# Patient Record
Sex: MALE | Race: WHITE | URBAN - METROPOLITAN AREA
[De-identification: names, ages, dates, MRNs, and addresses within clinical notes are randomized per-mention and may not be internally consistent; named-entity substitution may affect disease eponyms.]

---

## 2020-12-04 ENCOUNTER — NEW PATIENT (OUTPATIENT)
Dept: URBAN - METROPOLITAN AREA CLINIC 85 | Facility: CLINIC | Age: 65
End: 2020-12-04
Payer: COMMERCIAL

## 2020-12-04 DIAGNOSIS — H25.13 AGE-RELATED NUCLEAR CATARACT, BILATERAL: Primary | ICD-10-CM

## 2020-12-04 DIAGNOSIS — H04.123 TEAR FILM INSUFFICIENCY OF BILATERAL LACRIMAL GLANDS: ICD-10-CM

## 2020-12-04 DIAGNOSIS — H25.043 POSTERIOR SUBCAPSULAR POLAR AGE-RELATED CATARACT, BILATERAL: ICD-10-CM

## 2020-12-04 PROCEDURE — 92004 COMPRE OPH EXAM NEW PT 1/>: CPT | Performed by: OPHTHALMOLOGY

## 2020-12-04 PROCEDURE — 92015 DETERMINE REFRACTIVE STATE: CPT | Performed by: OPHTHALMOLOGY

## 2020-12-04 RX ORDER — KETOROLAC TROMETHAMINE 5 MG/ML
0.5 % SOLUTION OPHTHALMIC
Qty: 5 | Refills: 1 | Status: ACTIVE
Start: 2020-12-04

## 2020-12-04 ASSESSMENT — KERATOMETRY
OS: 43.25
OD: 43.13

## 2020-12-04 ASSESSMENT — VISUAL ACUITY
OD: 20/30
OS: 20/60

## 2020-12-04 ASSESSMENT — INTRAOCULAR PRESSURE
OS: 14
OD: 14

## 2022-07-11 ENCOUNTER — OFFICE VISIT (OUTPATIENT)
Dept: URBAN - METROPOLITAN AREA CLINIC 85 | Facility: CLINIC | Age: 67
End: 2022-07-11
Payer: COMMERCIAL

## 2022-07-11 DIAGNOSIS — H25.043 POSTERIOR SUBCAPSULAR POLAR AGE-RELATED CATARACT, BILATERAL: ICD-10-CM

## 2022-07-11 DIAGNOSIS — H43.813 VITREOUS DEGENERATION, BILATERAL: ICD-10-CM

## 2022-07-11 DIAGNOSIS — H25.13 AGE-RELATED NUCLEAR CATARACT, BILATERAL: Primary | ICD-10-CM

## 2022-07-11 DIAGNOSIS — H40.023 OPEN ANGLE WITH BORDERLINE FINDINGS, HIGH RISK, BILATERAL: ICD-10-CM

## 2022-07-11 DIAGNOSIS — H04.123 DRY EYE SYNDROME OF BILATERAL LACRIMAL GLANDS: ICD-10-CM

## 2022-07-11 PROCEDURE — 92004 COMPRE OPH EXAM NEW PT 1/>: CPT | Performed by: OPTOMETRIST

## 2022-07-11 ASSESSMENT — VISUAL ACUITY
OS: CF 3FT
OD: 20/400

## 2022-07-11 ASSESSMENT — INTRAOCULAR PRESSURE
OS: 17
OD: 18

## 2022-07-11 NOTE — IMPRESSION/PLAN
Impression: Open angle with borderline findings, high risk, bilateral: H40.023. Plan: Discussed findings with patient and need for continued follow up here with periodic testing and asking family members in order to accurately assess risk. Recommend monitoring with glaucoma testing CVF, RNFL OCT, pach, etc. after CE/IOL OU, as with density of lenticular opacity, glaucoma testing will be affected.

## 2022-07-11 NOTE — IMPRESSION/PLAN
Impression: Age-related nuclear cataract, bilateral: H25.13. Plan: Discussed cataract findings. Discussed option of ce/iol OU along with questionable VA potential with macular findings and optic nerve cupping. Discussed risks, potential benefits, potential complications, and alternatives to surgery. Patient desires to have surgery. Recommend CE w/IOL consult with Dr. Adele Burk.

## 2022-12-01 ENCOUNTER — OFFICE VISIT (OUTPATIENT)
Dept: URBAN - METROPOLITAN AREA CLINIC 85 | Facility: CLINIC | Age: 67
End: 2022-12-01
Payer: COMMERCIAL

## 2022-12-01 DIAGNOSIS — H25.13 AGE-RELATED NUCLEAR CATARACT, BILATERAL: Primary | ICD-10-CM

## 2022-12-01 DIAGNOSIS — H25.043 POSTERIOR SUBCAPSULAR POLAR AGE-RELATED CATARACT, BILATERAL: ICD-10-CM

## 2022-12-01 PROCEDURE — 92014 COMPRE OPH EXAM EST PT 1/>: CPT | Performed by: OPHTHALMOLOGY

## 2022-12-01 PROCEDURE — 92136 OPHTHALMIC BIOMETRY: CPT | Performed by: OPHTHALMOLOGY

## 2022-12-01 ASSESSMENT — VISUAL ACUITY
OS: CF 1FT
OD: CF 5FT

## 2022-12-01 ASSESSMENT — INTRAOCULAR PRESSURE
OS: 18
OD: 18

## 2022-12-01 NOTE — IMPRESSION/PLAN
Impression: Age-related nuclear cataract, bilateral: H25.13. Plan: Cataracts account for the patient's complaints. Discussed all risks, benefits, procedures and recovery. Patient has quality of life issues. Patient understands changing glasses will not improve vision. Patient desires to have surgery, recommend phacoemulsification with intraocular lens. CE w/IOL _OS___. R/B/A discussed. RL2. Lens: _STD_   Aim: __plano_.   Asiya Salazar

## 2023-01-04 ENCOUNTER — SURGERY (OUTPATIENT)
Dept: URBAN - METROPOLITAN AREA SURGERY 55 | Facility: SURGERY | Age: 68
End: 2023-01-04
Payer: COMMERCIAL

## 2023-01-04 DIAGNOSIS — H25.13 AGE-RELATED NUCLEAR CATARACT, BILATERAL: Primary | ICD-10-CM

## 2023-01-04 PROCEDURE — 66984 XCAPSL CTRC RMVL W/O ECP: CPT | Performed by: OPHTHALMOLOGY

## 2023-01-05 ENCOUNTER — POST-OPERATIVE VISIT (OUTPATIENT)
Dept: URBAN - METROPOLITAN AREA CLINIC 85 | Facility: CLINIC | Age: 68
End: 2023-01-05
Payer: COMMERCIAL

## 2023-01-05 DIAGNOSIS — Z48.810 ENCOUNTER FOR SURGICAL AFTERCARE FOLLOWING SURGERY ON A SENSE ORGAN: Primary | ICD-10-CM

## 2023-01-05 PROCEDURE — 99024 POSTOP FOLLOW-UP VISIT: CPT | Performed by: OPHTHALMOLOGY

## 2023-01-05 ASSESSMENT — INTRAOCULAR PRESSURE: OS: 20

## 2023-01-05 NOTE — IMPRESSION/PLAN
Impression:  Encounter for surgical aftercare following surgery on a sense organ  Z48.810. Plan: Follow post op instructions and RTC as scheduled.

## 2023-01-11 ENCOUNTER — POST-OPERATIVE VISIT (OUTPATIENT)
Dept: URBAN - METROPOLITAN AREA CLINIC 85 | Facility: CLINIC | Age: 68
End: 2023-01-11
Payer: COMMERCIAL

## 2023-01-11 DIAGNOSIS — Z48.810 ENCOUNTER FOR SURGICAL AFTERCARE FOLLOWING SURGERY ON A SENSE ORGAN: Primary | ICD-10-CM

## 2023-01-11 PROCEDURE — 99024 POSTOP FOLLOW-UP VISIT: CPT | Performed by: OPTOMETRIST

## 2023-01-11 ASSESSMENT — VISUAL ACUITY
OS: 20/20
OD: CF 5FT

## 2023-01-11 ASSESSMENT — INTRAOCULAR PRESSURE
OD: 16
OS: 15

## 2023-01-11 NOTE — IMPRESSION/PLAN
Impression: S/P Cataract Extraction by phacoemulsification with IOL placement OS - 7 Days. Encounter for surgical aftercare following surgery on a sense organ  Z48.810. Plan: Follow post op instructions per surgeon and RTC as scheduled or ASAP if pain, decreased vision, or any worsening of condition.

## 2023-01-18 ENCOUNTER — SURGERY (OUTPATIENT)
Dept: URBAN - METROPOLITAN AREA SURGERY 55 | Facility: SURGERY | Age: 68
End: 2023-01-18
Payer: COMMERCIAL

## 2023-01-18 DIAGNOSIS — H25.11 AGE-RELATED NUCLEAR CATARACT, RIGHT EYE: Primary | ICD-10-CM

## 2023-01-18 PROCEDURE — 66984 XCAPSL CTRC RMVL W/O ECP: CPT | Performed by: OPHTHALMOLOGY

## 2023-01-19 ENCOUNTER — POST-OPERATIVE VISIT (OUTPATIENT)
Dept: URBAN - METROPOLITAN AREA CLINIC 85 | Facility: CLINIC | Age: 68
End: 2023-01-19
Payer: COMMERCIAL

## 2023-01-19 DIAGNOSIS — Z48.810 ENCOUNTER FOR SURGICAL AFTERCARE FOLLOWING SURGERY ON A SENSE ORGAN: Primary | ICD-10-CM

## 2023-01-19 PROCEDURE — 99024 POSTOP FOLLOW-UP VISIT: CPT | Performed by: OPHTHALMOLOGY

## 2023-01-19 RX ORDER — KETOROLAC TROMETHAMINE 5 MG/ML
0.5 % SOLUTION OPHTHALMIC
Qty: 5 | Refills: 1 | Status: ACTIVE
Start: 2023-01-19

## 2023-01-19 ASSESSMENT — INTRAOCULAR PRESSURE: OD: 18

## 2023-01-19 NOTE — IMPRESSION/PLAN
Impression: S/P Cataract Extraction by phacoemulsification with IOL placement OD - 1 Day. Encounter for surgical aftercare following surgery on a sense organ  Z48.810. OCT shows wet AMD OD. See retinal specialist on next PO visit. Plan: RTC as scheduled or   ASAP if there should be any decrease in vision, pain, or any worsening of condition. Follow post op instructions per surgeon and RTC as scheduled or ASAP if pain, decreased vision, or any worsening of condition.

## 2023-02-08 ENCOUNTER — OFFICE VISIT (OUTPATIENT)
Dept: URBAN - METROPOLITAN AREA CLINIC 85 | Facility: CLINIC | Age: 68
End: 2023-02-08
Payer: COMMERCIAL

## 2023-02-08 DIAGNOSIS — H35.3211 EXDTVE AGE-REL MCLR DEGN, RIGHT EYE, WITH ACTV CHRDL NEOVAS: Primary | ICD-10-CM

## 2023-02-08 DIAGNOSIS — Z96.1 PRESENCE OF INTRAOCULAR LENS: Primary | ICD-10-CM

## 2023-02-08 PROCEDURE — 99214 OFFICE O/P EST MOD 30 MIN: CPT | Performed by: OPHTHALMOLOGY

## 2023-02-08 PROCEDURE — 92134 CPTRZ OPH DX IMG PST SGM RTA: CPT | Performed by: OPHTHALMOLOGY

## 2023-02-08 PROCEDURE — 67028 INJECTION EYE DRUG: CPT | Performed by: OPHTHALMOLOGY

## 2023-02-08 PROCEDURE — 99024 POSTOP FOLLOW-UP VISIT: CPT | Performed by: OPHTHALMOLOGY

## 2023-02-08 ASSESSMENT — INTRAOCULAR PRESSURE
OD: 17
OS: 15
OD: 17
OS: 15

## 2023-02-08 ASSESSMENT — VISUAL ACUITY
OS: 20/20
OD: CF 3FT

## 2023-02-08 NOTE — IMPRESSION/PLAN
Impression: S/P Cataract Extraction by phacoemulsification with IOL placement OD - 21 Days. Presence of intraocular lens  Z96.1. Plan: To see Dr Rosa Navarro today.

## 2023-02-08 NOTE — IMPRESSION/PLAN
Impression: Exdtve age-rel mclr degn, right eye, with actv chrdl neovas: H35.3211. Plan: The exam and oct show that the patient has active SRF OD. The risks and benefits of a series of intravitreal avastin injections was discussed with the patient and a 1.25 mg intravitreal avastin injection was administered without complication OD. The patient will return to clinic in 4-6 weeks for a repeat avastin injection OD.

## 2023-03-21 ENCOUNTER — PROCEDURE (OUTPATIENT)
Dept: URBAN - METROPOLITAN AREA CLINIC 85 | Facility: CLINIC | Age: 68
End: 2023-03-21
Payer: COMMERCIAL

## 2023-03-21 DIAGNOSIS — H35.3211 EXDTVE AGE-REL MCLR DEGN, RIGHT EYE, WITH ACTV CHRDL NEOVAS: Primary | ICD-10-CM

## 2023-03-21 PROCEDURE — 67028 INJECTION EYE DRUG: CPT | Performed by: OPHTHALMOLOGY

## 2023-03-21 ASSESSMENT — INTRAOCULAR PRESSURE
OD: 12
OS: 12

## 2023-03-21 NOTE — IMPRESSION/PLAN
Impression: Exdtve age-rel mclr degn, right eye, with actv chrdl neovas: H35.3211. Plan:  a 1.25 mg intravitreal avastin injection was administered without complication OD. The patient will return to clinic in 4-6 weeks for a repeat avastin injection OD.

## 2023-04-25 ENCOUNTER — PROCEDURE (OUTPATIENT)
Dept: URBAN - METROPOLITAN AREA CLINIC 85 | Facility: CLINIC | Age: 68
End: 2023-04-25
Payer: COMMERCIAL

## 2023-04-25 DIAGNOSIS — H35.3211 EXDTVE AGE-REL MCLR DEGN, RIGHT EYE, WITH ACTV CHRDL NEOVAS: Primary | ICD-10-CM

## 2023-04-25 PROCEDURE — 67028 INJECTION EYE DRUG: CPT | Performed by: OPHTHALMOLOGY

## 2023-04-25 ASSESSMENT — INTRAOCULAR PRESSURE
OD: 17
OS: 16

## 2023-04-25 NOTE — IMPRESSION/PLAN
Impression: Exdtve age-rel mclr degn, right eye, with actv chrdl neovas: H35.3211. Plan: a 1.25 mg intravitreal avastin injection was administered without complication OD. The patient will return to clinic in 4-6 weeks for a dilated follow up and possible oct.